# Patient Record
(demographics unavailable — no encounter records)

---

## 2025-02-12 NOTE — CONSULT LETTER
[Dear  ___] : Dear  [unfilled], [Courtesy Letter:] : I had the pleasure of seeing your patient, [unfilled], in my office today. [Please see my note below.] : Please see my note below. [Sincerely,] : Sincerely, [FreeTextEntry2] : Dr Martin [FreeTextEntry3] : Vikki Fang  MSN  CPNP Pediatric Nurse Practitioner Department of Pediatric Surgery [facility] phone [phone] fax [phone]

## 2025-02-12 NOTE — REASON FOR VISIT
[Patient] : patient [Mother] : mother [Medical Records] : medical records [Laparoscopic appendectomy, acute] : acute laparoscopic appendectomy [Pain] : ~He/She~ does not have pain [Fever] : ~He/She~ does not have fever [Normal bowel movements] : ~He/She~ has normal bowel movements [Vomiting] : ~He/She~ does not have vomiting [Tolerating Diet] : ~He/She~ is tolerating diet [Redness at incision] : ~He/She~ does not have redness at incision [Drainage at incision] : ~He/She~ does not have drainage at incision [Swelling at surgical site] : ~He/She~ does not have swelling at surgical site [de-identified] : 1-17-25 [de-identified] : Dr Berry [de-identified] : MEHRAN is 3.5 weeks post op from his appendectomy. His pathology is consistent with acute appendicitis.  He was discharged home on the same day as surgery.  He presents for a post op visit.

## 2025-02-12 NOTE — REASON FOR VISIT
[Patient] : patient [Mother] : mother [Medical Records] : medical records [Laparoscopic appendectomy, acute] : acute laparoscopic appendectomy [Pain] : ~He/She~ does not have pain [Fever] : ~He/She~ does not have fever [Normal bowel movements] : ~He/She~ has normal bowel movements [Vomiting] : ~He/She~ does not have vomiting [Tolerating Diet] : ~He/She~ is tolerating diet [Redness at incision] : ~He/She~ does not have redness at incision [Drainage at incision] : ~He/She~ does not have drainage at incision [Swelling at surgical site] : ~He/She~ does not have swelling at surgical site [de-identified] : 1-17-25 [de-identified] : Dr Berry [de-identified] : MEHRAN is 3.5 weeks post op from his appendectomy. His pathology is consistent with acute appendicitis.  He was discharged home on the same day as surgery.  He presents for a post op visit.

## 2025-02-12 NOTE — CONSULT LETTER
[Dear  ___] : Dear  [unfilled], [Courtesy Letter:] : I had the pleasure of seeing your patient, [unfilled], in my office today. [Please see my note below.] : Please see my note below. [Sincerely,] : Sincerely, [FreeTextEntry2] : Dr Martin [FreeTextEntry3] : Vikki Fang  MSN  CPNP Pediatric Nurse Practitioner Department of Pediatric Surgery [facility] phone [phone] fax [phone]

## 2025-02-12 NOTE — ASSESSMENT
[FreeTextEntry1] : MEHRAN  has recovered well from his appendectomy.  I reviewed the pathology with the family.  He is cleared to resume normal activities at 2 weeks post op.  Counseled MEHRAN and his family about remembering that his appendix has been removed despite not having a noticeable abdominal incision or scar.  Post operative expectations reviewed. No need for further follow up, unless the family has concerns regarding the surgery or recovery.  All questions answered.